# Patient Record
Sex: FEMALE | Race: WHITE
[De-identification: names, ages, dates, MRNs, and addresses within clinical notes are randomized per-mention and may not be internally consistent; named-entity substitution may affect disease eponyms.]

---

## 2017-08-08 ENCOUNTER — HOSPITAL ENCOUNTER (OUTPATIENT)
Dept: HOSPITAL 74 - JER | Age: 57
Setting detail: OBSERVATION
LOS: 2 days | Discharge: HOME | End: 2017-08-10
Attending: FAMILY MEDICINE | Admitting: FAMILY MEDICINE
Payer: COMMERCIAL

## 2017-08-08 VITALS — BODY MASS INDEX: 22.5 KG/M2

## 2017-08-08 DIAGNOSIS — R07.9: ICD-10-CM

## 2017-08-08 DIAGNOSIS — J45.909: ICD-10-CM

## 2017-08-08 DIAGNOSIS — M25.519: ICD-10-CM

## 2017-08-08 DIAGNOSIS — F41.9: ICD-10-CM

## 2017-08-08 DIAGNOSIS — B02.9: ICD-10-CM

## 2017-08-08 DIAGNOSIS — M54.5: Primary | ICD-10-CM

## 2017-08-08 DIAGNOSIS — F32.9: ICD-10-CM

## 2017-08-08 DIAGNOSIS — Z87.891: ICD-10-CM

## 2017-08-08 DIAGNOSIS — M54.6: ICD-10-CM

## 2017-08-08 DIAGNOSIS — M79.1: ICD-10-CM

## 2017-08-08 DIAGNOSIS — G62.9: ICD-10-CM

## 2017-08-08 LAB
ALBUMIN SERPL-MCNC: 4.2 G/DL (ref 3.4–5)
ALP SERPL-CCNC: 69 U/L (ref 45–117)
ALT SERPL-CCNC: 23 U/L (ref 12–78)
ANION GAP SERPL CALC-SCNC: 6 MMOL/L (ref 8–16)
APPEARANCE UR: CLEAR
AST SERPL-CCNC: 21 U/L (ref 15–37)
BASOPHILS # BLD: 1 % (ref 0–2)
BILIRUB SERPL-MCNC: 0.8 MG/DL (ref 0.2–1)
BILIRUB UR STRIP.AUTO-MCNC: NEGATIVE MG/DL
CALCIUM SERPL-MCNC: 9.8 MG/DL (ref 8.5–10.1)
CO2 SERPL-SCNC: 32 MMOL/L (ref 21–32)
COLOR UR: YELLOW
CREAT SERPL-MCNC: 0.7 MG/DL (ref 0.55–1.02)
DEPRECATED RDW RBC AUTO: 12.4 % (ref 11.6–15.6)
EOSINOPHIL # BLD: 1.4 % (ref 0–4.5)
GLUCOSE SERPL-MCNC: 91 MG/DL (ref 74–106)
KETONES UR QL STRIP: (no result)
LEUKOCYTE ESTERASE UR QL STRIP.AUTO: NEGATIVE
MCH RBC QN AUTO: 31.9 PG (ref 25.7–33.7)
MCHC RBC AUTO-ENTMCNC: 34.1 G/DL (ref 32–36)
MCV RBC: 93.6 FL (ref 80–96)
NEUTROPHILS # BLD: 50.3 % (ref 42.8–82.8)
NITRITE UR QL STRIP: NEGATIVE
PH UR: 6 [PH] (ref 5–8)
PLATELET # BLD AUTO: 211 K/MM3 (ref 134–434)
PMV BLD: 8.9 FL (ref 7.5–11.1)
PROT SERPL-MCNC: 8 G/DL (ref 6.4–8.2)
PROT UR QL STRIP: NEGATIVE
PROT UR QL STRIP: NEGATIVE
RBC # UR STRIP: NEGATIVE /UL
UROBILINOGEN UR STRIP-MCNC: NEGATIVE MG/DL (ref 0.2–1)
VIT B12 SERPL-MCNC: 1022 PG/ML (ref 180–914)
WBC # BLD AUTO: 5.4 K/MM3 (ref 4–10)

## 2017-08-08 PROCEDURE — G0378 HOSPITAL OBSERVATION PER HR: HCPCS

## 2017-08-08 PROCEDURE — 3E0F7GC INTRODUCTION OF OTHER THERAPEUTIC SUBSTANCE INTO RESPIRATORY TRACT, VIA NATURAL OR ARTIFICIAL OPENING: ICD-10-PCS | Performed by: FAMILY MEDICINE

## 2017-08-08 PROCEDURE — 3E013GC INTRODUCTION OF OTHER THERAPEUTIC SUBSTANCE INTO SUBCUTANEOUS TISSUE, PERCUTANEOUS APPROACH: ICD-10-PCS | Performed by: FAMILY MEDICINE

## 2017-08-08 PROCEDURE — 3E0337Z INTRODUCTION OF ELECTROLYTIC AND WATER BALANCE SUBSTANCE INTO PERIPHERAL VEIN, PERCUTANEOUS APPROACH: ICD-10-PCS | Performed by: FAMILY MEDICINE

## 2017-08-08 PROCEDURE — 3E033GC INTRODUCTION OF OTHER THERAPEUTIC SUBSTANCE INTO PERIPHERAL VEIN, PERCUTANEOUS APPROACH: ICD-10-PCS | Performed by: FAMILY MEDICINE

## 2017-08-08 RX ADMIN — HEPARIN SODIUM SCH UNIT: 5000 INJECTION, SOLUTION INTRAVENOUS; SUBCUTANEOUS at 22:22

## 2017-08-08 RX ADMIN — ACETAMINOPHEN PRN MG: 325 TABLET ORAL at 22:26

## 2017-08-08 RX ADMIN — GABAPENTIN SCH MG: 100 CAPSULE ORAL at 22:22

## 2017-08-08 NOTE — PN
Progress Note (short form)





- Note


Progress Note: 


NEUROSURGERY CONSULT DICTATED





Chart reviewed


C and LS spine MRI reviewed


57F with asthma c/o back pain x 1 month. She was sent by Dr. Herrmann.  CP x 2 

days. She states that her CP is related to her asthma and coughs. Her back pain 

started in her mid back with numbness, which progressed down her slow back with 

numbness/burning of her left leg and toes.  No B/B dysfunction. Denies H/A, or N

/V or visual changes.  No fever/chill.


PE: AF, VSS


General- unremarkable and normal


CN- intact; Motor- 5/5 except L IP 4+/5; Sensation- intact LT/vibration; DTR- 2

+ UE, 3 + LE


WBC 5.3, Cr 0.7


C spine MRI: mild C5-6 disc bulge with minimal thecal sac impingement; minimal 

disc bulge C4-5 and C6-7; preserved lordosis


LS spine MRI: Mild L4-5 broad-based disc bulge; preserved lordosis


Minimal C and LS spine disc bulge unlikely the cause of her symptoms


T spine MRI reasonable given original location of pain and B LE symptoms


If persistent symptoms and T spine non-contributory could consider EMG/NCS and 

possibly LP per neurology


Trial of Neurontin


No nuerosurgical lesion thus far on her MRI's

## 2017-08-08 NOTE — PDOC
History of Present Illness





- General


Chief Complaint: Back Pain


Stated Complaint: PAIN/ CHEST, BACK (PCP REFERRED)


Time Seen by Provider: 08/08/17 16:32


History Source: Patient


Exam Limitations: No Limitations





- History of Present Illness


Initial Comments: 





08/08/17 17:28


The patient is a 57F with a PMH of asthma who was sent to the ED by her PCP for 

back pain workup. She was sent by Dr. Herrmann, and is a patient of Dr. Godinez. 

The patient is complaining of back pain x 1 month and CP x 2 days. She states 

that her CP is related to her asthma. Her back pain started in her t-spine with 

numbness, and has progressed down her spine to her lumbar spine and now the 

numbness has reached the tips of her toes. 


Dr. Herrmann wants a b-12, MRI of her spine done. 





Allergies: none


Surg: none


Soc: none





Past History





- Past Medical History


Allergies/Adverse Reactions: 


 Allergies











Allergy/AdvReac Type Severity Reaction Status Date / Time


 


No Known Allergies Allergy   Verified 08/08/17 15:47











Home Medications: 


Ambulatory Orders





Albuterol Sulfate [Proair Hfa -] 2 inh PO QID PRN #1 hfa.aer.ad 04/21/15 


Zolpidem Tartrate [Ambien] 5 mg PO HS #14 tablet 04/21/15 


Albuterol Sulfate Inhaler - [Ventolin Hfa Inhaler -] 1 - 2 inh PO QID #1 

inhaler 11/29/15 


Alprazolam [Xanax] 0.5 mg PO HS PRN 11/29/15 


Oxycodone HCl/Acetaminophen [Percocet 5/325 -] 1 tab PO Q6H #20 tablet 11/29/15 


Prednisone [Deltasone -] 40 mg PO DAILY #14 tablet 11/29/15 








Asthma: Yes





- Surgical History


Abdominal Surgery: Yes (TUBAL LIGATION)





- Psycho/Social/Smoking Cessation Hx


Anxiety: Yes


Suicidal Ideation: No


Smoking History: Former smoker


Have you smoked in the past 12 months: No


Number of Cigarettes Smoked Daily: 0


If you are a former smoker, when did you quit?: 6 months ago


Information on smoking cessation initiated: No


Hx Alcohol Use: No


Substance Use Type: Alcohol





**Review of Systems





- Review of Systems


Able to Perform ROS?: Yes


Is the patient limited English proficient: No


Constitutional: No: Chills, Fever


ABD/GI: Yes: Nausea, Vomiting, Other (abd pain).  No: Constipated, Diarrhea


Neurological: Yes: Numbness, Tingling, Weakness (whole body), Unsteady Gait.  No

: Ataxia, Dizziness





*Physical Exam





- Vital Signs


 Last Vital Signs











Temp Pulse Resp BP Pulse Ox


 


 98.3 F   76   18   152/85   97 


 


 08/08/17 15:47  08/08/17 15:47  08/08/17 15:47  08/08/17 15:47  08/08/17 15:47














- Physical Exam


General Appearance: Yes: Nourished, Appropriately Dressed.  No: Apparent 

Distress, Severe Distress


HEENT: positive: Normal Voice, Hearing Grossly Normal


Respiratory/Chest: positive: Lungs Clear, Normal Breath Sounds.  negative: 

Chest Tender, Respiratory Distress, Accessory Muscle Use


Cardiovascular: positive: Regular Rhythm, Regular Rate, S1, S2.  negative: 

Diastolic Murmur, Systolic Murmur


Gastrointestinal/Abdominal: positive: Flat, Soft.  negative: Tender, Distended, 

Guarding, Rebound, Tenderness


Musculoskeletal: positive: CVA Tenderness (L).  negative: CVA Tenderness, CVA 

Tenderness (R)


Integumentary: positive: Dry, Warm.  negative: Swelling, Ecchymosis


Neurologic: positive: CNs II-XII NML intact, Fully Oriented, Alert, Normal Mood/

Affect, Motor Strength 5/5, Respond to painful stimul, Responsive.  negative: 

Facial Droop, Numbness, Sensory Deficit, Confused, Disoriented





ED Treatment Course





- LABORATORY


CBC & Chemistry Diagram: 


 08/08/17 18:45





 08/08/17 18:45





Medical Decision Making





- Medical Decision Making


08/08/17 17:36


The patient is a 57F with a PMH of asthma who presents to the ED from her PCP's 

office for further workup. I have ordered basic labs and a UA and will call her 

PCP for admission for obs to further evaluate the patient's back pain.





08/08/17 18:17


I have spoken to Dr. Wing. He wants the patient admitted to obs under Dr. Godinez. Consult neurosurg Dr. Nichole and consult Dr. Herrmann for neurology. Orders 

will be put in.





08/08/17 19:05


Patient requesting pain medication. I have ordered 1 percocet. Pending 

admission.





08/08/17 19:05


Patient signed out to night team, Dr. Simmons.





*DC/Admit/Observation/Transfer


Diagnosis at time of Disposition: 


Back pain


Qualifiers:


 Back pain location: low back pain Chronicity: acute Back pain laterality: 

unspecified Sciatica presence: unspecified whether sciatica present Qualified 

Code(s): M54.5 - Low back pain





- Discharge Dispostion


Condition at time of disposition: Stable


Admit: Yes





- Referrals


Referrals: 


Damon Godinez MD [Primary Care Provider] - 





- Attestations


Physician Attestion: 





08/08/17 19:05








I, Dr. James Cerda, attest that this document has been prepared under my 

direction and personally reviewed by me in its entirety.   I further attest, 

that it accurately reflects all work, treatment, procedures and medical decision

-making performed by me.

## 2017-08-08 NOTE — PDOC
Attending Attestation





- Resident


Resident Name: OrkayJames





- ED Attending Attestation


I have performed the following: I have examined & evaluated the patient, The 

case was reviewed & discussed with the resident, I agree w/resident's findings 

& plan, Exceptions are as noted





- HPI


HPI: 





08/08/17 18:13


Agree with the resident's HPI as documented in the electronic medical record.





- Physicial Exam


PE: 





08/08/17 18:13


Agree with the resident's physical examination as documented in the electronic 

medical record.





- Medical Decision Making





08/08/17 18:13


57-year-old female with history of asthma presents the emergency department 

with 3 week history of descending paresthesias starting from her upper back and 

now extending into her lower extremities and the bottom of her feet; she was 

seen by a neurologist today who feels that she has a myelopathy.


Plan:


1. Admit to observation


2. MRI of the cervical thoracic and lumbar spine


3. Neurology consult


4. Observe and reevaluate

## 2017-08-09 LAB
ALBUMIN SERPL-MCNC: 3.7 G/DL (ref 3.4–5)
ALP SERPL-CCNC: 64 U/L (ref 45–117)
ALT SERPL-CCNC: 22 U/L (ref 12–78)
ANION GAP SERPL CALC-SCNC: 7 MMOL/L (ref 8–16)
AST SERPL-CCNC: 19 U/L (ref 15–37)
BASOPHILS # BLD: 0.6 % (ref 0–2)
BILIRUB SERPL-MCNC: 1 MG/DL (ref 0.2–1)
CALCIUM SERPL-MCNC: 9.3 MG/DL (ref 8.5–10.1)
CO2 SERPL-SCNC: 33 MMOL/L (ref 21–32)
CREAT SERPL-MCNC: 0.7 MG/DL (ref 0.55–1.02)
CRP SERPL-MCNC: < 0.3 MG/DL (ref 0–0.3)
DEPRECATED RDW RBC AUTO: 12.6 % (ref 11.6–15.6)
EOSINOPHIL # BLD: 2.2 % (ref 0–4.5)
ERYTHROCYTE [SEDIMENTATION RATE] IN BLOOD BY WESTERGREN METHOD: 16 MM/HR (ref 0–30)
GLUCOSE SERPL-MCNC: 98 MG/DL (ref 74–106)
MCH RBC QN AUTO: 31.1 PG (ref 25.7–33.7)
MCHC RBC AUTO-ENTMCNC: 33.2 G/DL (ref 32–36)
MCV RBC: 93.8 FL (ref 80–96)
NEUTROPHILS # BLD: 42.1 % (ref 42.8–82.8)
PLATELET # BLD AUTO: 180 K/MM3 (ref 134–434)
PMV BLD: 8.6 FL (ref 7.5–11.1)
PROT SERPL-MCNC: 7.1 G/DL (ref 6.4–8.2)
SP GR UR: 1.01 (ref 1–1.02)
TROPONIN I SERPL-MCNC: < 0.02 NG/ML (ref 0–0.05)
TSH SERPL-ACNC: 3.35 UIU/ML (ref 0.36–3.74)
WBC # BLD AUTO: 4.6 K/MM3 (ref 4–10)

## 2017-08-09 RX ADMIN — ALBUTEROL SULFATE SCH: 2.5 SOLUTION RESPIRATORY (INHALATION) at 00:18

## 2017-08-09 RX ADMIN — ALBUTEROL SULFATE SCH AMP: 2.5 SOLUTION RESPIRATORY (INHALATION) at 06:24

## 2017-08-09 RX ADMIN — HEPARIN SODIUM SCH UNIT: 5000 INJECTION, SOLUTION INTRAVENOUS; SUBCUTANEOUS at 09:29

## 2017-08-09 RX ADMIN — GUAIFENESIN AND CODEINE PHOSPHATE PRN ML: 10; 100 LIQUID ORAL at 21:25

## 2017-08-09 RX ADMIN — RANITIDINE SCH MG: 150 TABLET ORAL at 21:25

## 2017-08-09 RX ADMIN — RANITIDINE SCH MG: 150 TABLET ORAL at 12:21

## 2017-08-09 RX ADMIN — ALBUTEROL SULFATE SCH AMP: 2.5 SOLUTION RESPIRATORY (INHALATION) at 11:46

## 2017-08-09 RX ADMIN — ALBUTEROL SULFATE SCH AMP: 2.5 SOLUTION RESPIRATORY (INHALATION) at 18:36

## 2017-08-09 RX ADMIN — ACETAMINOPHEN PRN MG: 325 TABLET ORAL at 06:12

## 2017-08-09 RX ADMIN — GABAPENTIN SCH MG: 100 CAPSULE ORAL at 14:49

## 2017-08-09 RX ADMIN — HEPARIN SODIUM SCH UNIT: 5000 INJECTION, SOLUTION INTRAVENOUS; SUBCUTANEOUS at 21:25

## 2017-08-09 RX ADMIN — GABAPENTIN SCH MG: 100 CAPSULE ORAL at 06:12

## 2017-08-09 RX ADMIN — GABAPENTIN SCH MG: 100 CAPSULE ORAL at 21:25

## 2017-08-09 NOTE — CON.NEURO
Consult





- History of Present Illness


History of Present Illness: 


7 year old female history of asthma, she has been experiencing severe lower 

back pain and it shoots down to thoracic region and also she is feeling 

tingling and numbness in her feet. Patient saw me in clinic yesterday and she 

has bialteral clonus and difficulty walking on toes and heel. She has grade 4 

reflex in lower extremity and sensory ( tingling numbness in leg) symptoms in 

lower extremity.


She is complaining of severe back pain and she is getting oxycodone prn for 

pain. 





- Past Medical History


Pulmonary: Yes: Asthma


Psych: Yes: Anxiety, Depression


Rheumatology: Yes: Fibromyalgia





- Alcohol/Substance Use


Hx Alcohol Use: Yes (OCCASIONALLY)





- Smoking History


Smoking history: Former smoker


Have you smoked in the past 12 months: No


Aproximately how many cigarettes per day: 0


If you are a former smoker, when did you quit?: 6 months ago





Home Medications





- Allergies


Allergies/Adverse Reactions: 


 Allergies











Allergy/AdvReac Type Severity Reaction Status Date / Time


 


No Known Allergies Allergy   Verified 08/08/17 15:47














- Home Medications


Home Medications: 


Ambulatory Orders





Mometasone/Formoterol [Dulera 100 Mcg/5 Mcg Inhaler] 2 inh IH BID 08/08/17 











Physical Exam-Neuro


Vital Signs: 


 Vital Signs











Temperature  98.5 F   08/09/17 14:00


 


Pulse Rate  78   08/09/17 14:00


 


Respiratory Rate  17   08/09/17 14:00


 


Blood Pressure  103/64   08/09/17 14:00


 


O2 Sat by Pulse Oximetry (%)  100   08/09/17 10:00











Labs: 


 CBC, BMP





 08/09/17 07:30 





 08/09/17 07:30 











NIH Stroke Scale





- Total Score


NIH Stroke Scale Score: 0





Assessment/Plan


 cc lower back and thoracic pain 


57 year old female history of asthma, she has been experiencing severe lower 

back pain and it shoots down to thoracic region and also she is feeling 

tingling and numbness in her feet. Patient saw me in clinic yesterday and she 

has bialteral clonus and difficulty walking on toes and heel. She has grade 4 

reflex in lower extremity and sensory ( tingling numbness in leg) symptoms in 

lower extremity.


She is complaining of severe back pain and she is getting oxycodone prn for 

pain. 


Past Medical History of fibromyalgia, depression , and asthma


No known allergies








home medications








Mometasone/Formoterol [Dulera 100 Mcg/5 Mcg Inhaler] 2 inh IH BID 08/08/17 











Neurological Examination


Aler oriented x 3 


cn all intact 


motor strengh is 5/5


There is no motor weakness on testing individual group of muscle ( hip flexion, 

extension, knee flexion and exxtension, and dorsi and planter flexion)


she has been slight unsteady on walking and slight difficulty walking on toe 

and heel


There is no sensory loss





There is hyper reflexia in lower extremity upto grade 4 and mild clonus more 

pronounced on right side





planter is bilateral flexor 








Assessment-- Evidence of myelopahty ( complaining of severe thoracic pain and 

lumbar pain) and There is hyper reflexia and clonus in lower extremity.





Plan patient was sent from ED for urgent mri of c /t and l spine, as I spoke to 

ED doctor yeserday afternoon ( August 8)





Plan continue percocet prn for pain


- waiting for MRI of C/T AND L spine


Consult Neurosurgery after mri of spine done





Thanks for consult





Jose Herrmann MD


Neurology Attending.

## 2017-08-09 NOTE — EKG
Test Reason : 

Blood Pressure : ***/*** mmHG

Vent. Rate : 088 BPM     Atrial Rate : 088 BPM

   P-R Int : 166 ms          QRS Dur : 104 ms

    QT Int : 386 ms       P-R-T Axes : 073 080 050 degrees

   QTc Int : 467 ms

 

NORMAL SINUS RHYTHM

NORMAL ECG

WHEN COMPARED WITH ECG OF 08-AUG-2017 15:33,

NO SIGNIFICANT CHANGE WAS FOUND

Confirmed by HOWIE TORREZ MD ([id number]) on 8/9/2017 2:39:37 PM

 

Referred By: JOSE RODRIGUEZ           Confirmed By:HOWIE TORREZ MD

## 2017-08-09 NOTE — CONSULT
Consult


Consult Specialty:: Rheumatology





- History of Present Illness


History of Present Illness: 


57 year old female with history of asthma and short episode of inflammatory 

arthritis in the past, admitted with severe back pain and paresthesias in legs.





HPI.  One month ago the patient developed severe pain in the thoracic spine.  

The pain was radiated to legs and she reports intermittent paresthesias in 

feet. She does not respond to analgesics.  She was seen by Neurology (Alize Hutton).  C spine MRI: mild C5-6 disc bulge with minimal thecal sac impingement

; minimal disc bulge C4-5 and C6-7; preserved lordosis.    LS spine MRI: Mild L4

-5 broad-based disc bulge; preserved lordosis.    Minimal C and LS spine disc 

bulge.  Changes probably do not explain the etiology of the pain.  At the 

present time the patient is having an MRI of the thoracic spine.  Consult by 

Dr. Ramos pending.





I saw the patient in my office on 5/10/16.  At that time she had an episode of 

systemic inflammatory arthritis that resolved spontaneously after 2 weeks and 

serology including FRANCESCA, rheumatoid factor and CCP were negative.  Probably she 

had viral induced arthritis.














- History Source


History Provided By: Patient, Medical Record


Limitations to Obtaining History: No Limitations





- Past Medical History


Pulmonary: Yes: Asthma


Psych: Yes: Anxiety, Depression


Rheumatology: Yes: Fibromyalgia





- Alcohol/Substance Use


Hx Alcohol Use: Yes (OCCASIONALLY)





- Smoking History


Smoking history: Former smoker


Have you smoked in the past 12 months: No


Aproximately how many cigarettes per day: 0


If you are a former smoker, when did you quit?: 6 months ago





Home Medications





- Allergies


Allergies/Adverse Reactions: 


 Allergies











Allergy/AdvReac Type Severity Reaction Status Date / Time


 


No Known Allergies Allergy   Verified 08/08/17 15:47














- Home Medications


Home Medications: 


Ambulatory Orders





Mometasone/Formoterol [Dulera 100 Mcg/5 Mcg Inhaler] 2 inh IH BID 08/08/17 











Family Disease History





- Family Disease History


Family Disease History: Other: Mother (Hypertension and osteoporosis)





Review of Systems





- Review of Systems


Constitutional: reports: No Symptoms


Eyes: reports: No Symptoms


HENT: reports: No Symptoms


Neck: reports: No Symptoms


Cardiovascular: reports: No Symptoms


Respiratory: reports: No Symptoms


Gastrointestinal: reports: No Symptoms


Genitourinary: reports: No Symptoms


Integumentary: reports: No Symptoms


Neurological: reports: Other (See HPI)


Endocrine: reports: No Symptoms


Hematology/Lymphatic: reports: No Symptoms





Physical Exam


Vital Signs: 


 Vital Signs











Temperature  98.5 F   08/09/17 14:00


 


Pulse Rate  78   08/09/17 14:00


 


Respiratory Rate  17   08/09/17 14:00


 


Blood Pressure  103/64   08/09/17 14:00


 


O2 Sat by Pulse Oximetry (%)  100   08/09/17 10:00











Constitutional: Yes: Moderate Distress


Eyes: Yes: WNL


HENT: Yes: WNL


Neck: Yes: WNL


Cardiovascular: Yes: WNL


Respiratory: Yes: WNL


Gastrointestinal: Yes: WNL


Extremities: Yes: Other (Severe tenderness in thoracic cpine.  Reflexes normal.

   Hands with Heberden's nodes, no active joints.)


Labs: 


 CBC, BMP





 08/09/17 07:30 





 08/09/17 07:30 





 Laboratory Tests











  08/08/17 08/09/17 08/09/17





  23:20 07:30 07:30


 


ESR    16


 


C-Reactive Protein   < 0.3 


 


TSH   3.35 


 


Urine Color  Yellow  


 


Urine Appearance  Clear  


 


Urine pH  6.0  


 


Ur Specific Gravity  1.015  


 


Urine Protein  Negative  


 


Urine Glucose (UA)  Negative  


 


Urine Ketones  Trace H  


 


Urine Blood  Negative  


 


Urine Nitrite  Negative  


 


Urine Bilirubin  Negative  


 


Urine Urobilinogen  Negative  


 


Ur Leukocyte Esterase  Negative  


 


Urine HCG, Qual  Negative  














Problem List





- Problems


(1) Back pain


Assessment/Plan: 


Acute thoracic spine pain.  Etiology to be determined.


The patient does not have other inflammatory arthritis.


Plan.   Patient is having an MRI of the thoracic spine.  Being followed up by 

Neurology and Neurosurgery.   I will follow up report.  Continue same 

medications.





Code(s): M54.9 - DORSALGIA, UNSPECIFIED   Qualifiers: 


     Back pain location: low back pain     Chronicity: acute     Back pain 

laterality: unspecified     Sciatica presence: unspecified whether sciatica 

present     Qualified Code(s): M54.5 - Low back pain

## 2017-08-09 NOTE — CONS
REQUESTING PHYSICIAN:  Damon Godinez M.D.  

 

CHIEF COMPLAINT:  Mid and lower back pain and paresthesias including left lower 
and

right lower extremity paresthesia of 1-month duration.  

 

HISTORY OF PRESENT ILLNESS:  The patient is a 57-year-old right handed female 
with a

history of asthma on chronic inhaler treatment, who complains of a 1-month 
history of

worsening mid and lower back pain.  She also has burning in her low back as 
well as

burning and numbness in her bilateral lower extremities, left greater than 
right. 

There is also some subjective weakness in the left lower extremity.  She denies 
bowel

or bladder incontinence.  She has no increasing headache, nausea, vomiting, or 
visual

changes.  She denies Lhermitte sign.  She has no symptoms in her arms.  Because 
of

the pain it is difficult for her to ambulate.  She did see Dr. Herrmann, her

neurologist, and was prescribed a MRI of the cervical and lumbar spine.  
Because of

her persistent symptoms she was sent to the emergency room for evaluation and

treatment.  

 

PAST MEDICAL HISTORY: Significant for asthma. 

 

MEDICATIONS:  Inhaler.  

 

ALLERGIES:  No known drug allergy.  

 

FAMILY HISTORY:  Noncontributory.  

 

SOCIAL HISTORY:  In terms of social history she does not work and lives with 
her son

at home.  She used to be a smoker but has not smoked for a year or two.  She 
drinks

alcohol socially.  She does not work.  

 

REVIEW OF SYSTEMS:  Otherwise negative for constitutional, cardiovascular,

gastrointestinal, pulmonary, genitourinary, gynecological, oncological, 
neurological,

and psychological problems except for the above.  

 

FAMILY HISTORY:  Noncontributory.

 

PHYSICAL EXAMINATION: 

Vital Signs:  Temperature is 98.3, blood pressure is 152/85 with a pulse rate 
of 76,

and O2 saturation is 97% on room air.  

HEENT:  Shows her to be normocephalic, atraumatic and icteric.  

Neck:  Supple with no nuchal rigidity.  

Coronary:  Demonstrated regular rhythm.  

Lungs:  Clear bilaterally.  

Abdomen:  Benign.  

Extremities:  Shows no signs of DVT.  

Neurologic:  She is awake, alert, oriented x4.  Cranial nerves examination 
intact

II-XII.  Motor examination shows 5/5 strength except left iliopsoas which is 4/
5 and

left quadriceps which is 4+/5.  This is pain limited.  Sensory examination shows

intact sensation light tough and vibratory sensation.  Deep tendon reflexes are 
2+ in

the upper extremity and 3+ in the lower extremity.  Gait is not tested for 
safety

reasons.  

Cerebellar:  Shows intact finger-to-nose examination.  

 

LABORATORY EXAMINATION:  Shows white blood cell count to be 5400, hemoglobin is 
13.6

and platelet count is 211,000.  Serum sodium 139, potassium is 4.1, BUN 12,

creatinine 0.7.  Vitamin B12 level is pending.  MRI of the cervical spine (2016)

demonstrated C5-C6 greater than C4-C5, C6-C7 disk bulge.  There is preserved 
cervical

lordosis.  There is minimal thecal sac impingement at C5-C6.  There is no cord 
or

nerve root impingement at any level.  Cervical lordosis is well-maintained.  
Lumbar

spine MRI (2016) demonstrated broad-based L4-L5 disk bulge without canal 
compromise.  

There is no canal foraminal stenosis.  Lordosis is similarly preserved in the 
lumbar spine.

 

 

IMPRESSION:  

1.  Mild cervical and lumbar degenerative disk bulges without significant

neurological element impingement.  

2.  Rule out thoracic mid or lower thoracic spine pathology.

3.  Asthma.  

 

RECOMMENDATIONS:  The patient presents with 1-month history of progressive mid 
and

lower back pain.  She has paresthesia of her legs as well as numbness.  It is

predominantly left-sided.  She has no upper extremity symptoms at this time.  
MRI of

the thoracic spine is reasonable given that cervical and lumbar spine pathology 
does

not adequately explain the sources of her symptomatology.  If a T-spine MRI is 
not

contributory, EMG and nerve conduction studies of the lower extremity as

well as possibly a lumbar puncture per the neurology team would be reasonable.  
The

above was discussed with the patient at that time.  In the meanwhile, I took the

liberty of starting her on gabapentin 100 mg p.o. three times a day.  The 
cervical

and lumbar spine MRI findings were reviewed with the patient.  

 

 

EDWARD LINDO M.D.

 

[id number]

DD: 08/08/2017 20:13

DT: 08/09/2017 08:52

Job #:  [id number]

[email or web address]

## 2017-08-09 NOTE — PN
Progress Note (short form)





- Note


Progress Note: 


NEUROSURGERY 





Back and LE symptoms the same


Burning L lat thigh


PE: Tmax 99.4, AF, VSS


General- unremarkable and normal


CN- intact; Motor- 5/5 except L IP 4+/5; Sensation- intact LT/vibration; DTR- 2

+ UE, 3 + LE, no clonus, no Babinski


C spine MRI (7/2016): mild C5-6 disc bulge with minimal thecal sac impingement; 

minimal disc bulge C4-5 and C6-7; preserved lordosis


LS spine MRI (7/2016): Mild L4-5 broad-based disc bulge; preserved lordosis


Minimal C and LS spine disc bulge unlikely the cause of her symptoms


T spine MRI reasonable given location of pain and B LE symptoms with associated 

hyperreflexia


If persistent symptoms could consider LP per neurology


EMG/NCS ordered


Neurontin started


No nuerosurgical lesion noted based on C and LS spine MRI's

## 2017-08-09 NOTE — HP
Admitting History and Physical





- Primary Care Physician


PCP: Damon Godinez





- Admission


Chief Complaint: NEUROPATHY PROGRESSIVE AND DESCENDING TO LEGS, INTRACTABLE PAIN


History of Present Illness: 


The patient is a 57F with a PMH of asthma who was sent to the ED by her PCP for 

back pain workup. She was sent by Dr. Herrmann, and is a patient of Dr. Godinez. 

The patient is complaining of back pain x 1 month and CP x 2 days. She states 

that her CP is related to her asthma. Her back pain started in her t-spine with 

numbness, and has progressed down her spine to her lumbar spine and now the 

numbness has reached the tips of her toes. 


Dr. Herrmann wants a b-12, MRI of her spine done. 


History Source: Patient, Medical Record


Limitations to Obtaining History: No Limitations





- Past Medical History


Pulmonary: Yes: Asthma


Psych: Yes: Anxiety, Depression


Rheumatology: Yes: Fibromyalgia





- Smoking History


Smoking history: Former smoker


Have you smoked in the past 12 months: No


Aproximately how many cigarettes per day: 0


If you are a former smoker, when did you quit?: 6 months ago





- Alcohol/Substance Use


Hx Alcohol Use: Yes (OCCASIONALLY)





Home Medications





- Allergies


Allergies/Adverse Reactions: 


 Allergies











Allergy/AdvReac Type Severity Reaction Status Date / Time


 


No Known Allergies Allergy   Verified 08/08/17 15:47














- Home Medications


Home Medications: 


Ambulatory Orders





Mometasone/Formoterol [Dulera 100 Mcg/5 Mcg Inhaler] 2 inh IH BID 08/08/17 











Review of Systems





- Review of Systems


Constitutional: reports: Loss of Appetite, Malaise


Eyes: reports: No Symptoms


HENT: reports: No Symptoms


Neck: reports: No Symptoms


Cardiovascular: reports: No Symptoms


Respiratory: reports: Cough


Gastrointestinal: reports: Abdominal Pain


Genitourinary: reports: No Symptoms


Musculoskeletal: reports: Back Pain, Decreased ROM, Extremity Pain, Joint Pain, 

Muscle Pain


Integumentary: reports: No Symptoms


Neurological: reports: Numbness, Weakness


Endocrine: reports: No Symptoms


Hematology/Lymphatic: reports: No Symptoms


Psychiatric: reports: Anxiety, Depression





Physical Examination


Vital Signs: 


 Vital Signs











Temperature  98.8 F   08/09/17 08:44


 


Pulse Rate  90   08/09/17 08:44


 


Respiratory Rate  20   08/09/17 10:00


 


Blood Pressure  111/72   08/09/17 08:44


 


O2 Sat by Pulse Oximetry (%)  100   08/09/17 10:00











Constitutional: Yes: Moderate Distress


Eyes: Yes: WNL


HENT: Yes: WNL


Neck: Yes: WNL


Cardiovascular: Yes: WNL


Respiratory: Yes: WNL


Gastrointestinal: Yes: WNL


Renal/: Yes: WNL


Musculoskeletal: Yes: Back Pain, Muscle Pain, Muscle Weakness


Extremities: Yes: WNL


Edema: No


Peripheral Pulses WNL: Yes


Integumentary: Yes: WNL


Wound/Incision: Yes: Clean/Dry


Neurological: Yes: WNL


...Motor Strength: WNL


Psychiatric: Yes: Other


Labs: 


 CBC, BMP





 08/09/17 07:30 





 08/09/17 07:30 











Problem List





- Problems


(1) Back pain


Code(s): M54.9 - DORSALGIA, UNSPECIFIED   Qualifiers: 


     Back pain location: low back pain     Chronicity: acute     Back pain 

laterality: unspecified     Sciatica presence: unspecified whether sciatica 

present     Qualified Code(s): M54.5 - Low back pain  





(2) Atypical chest pain


Code(s): R07.89 - OTHER CHEST PAIN





(3) Myalgia


Code(s): M79.1 - MYALGIA





(4) Shoulder pain


Code(s): M25.519 - PAIN IN UNSPECIFIED SHOULDER   Qualifiers: 


     Laterality: bilateral





(5) Neuropathy


Code(s): G62.9 - POLYNEUROPATHY, UNSPECIFIED








Assessment/Plan


MEDROL STARTED


NOT SURE IF THIS IS EXAGERRATED WITH PAIN WITH COMBINATION OF ANXIETY


H/O FIBROMYALGIA?


RHEUMATOLOGY CONSULT


ESR AND CRP NORMAL


NEUROLOGY AND NEUROSURGERY F/O APPRECIATED


MRI SPINE PENDING


NO OPIODS


TRAMADOL PRN


NEBS


ROBITUSSIN PRN

## 2017-08-10 VITALS — HEART RATE: 95 BPM | SYSTOLIC BLOOD PRESSURE: 138 MMHG | TEMPERATURE: 98.5 F | DIASTOLIC BLOOD PRESSURE: 72 MMHG

## 2017-08-10 RX ADMIN — ALBUTEROL SULFATE SCH AMP: 2.5 SOLUTION RESPIRATORY (INHALATION) at 00:05

## 2017-08-10 RX ADMIN — HEPARIN SODIUM SCH UNIT: 5000 INJECTION, SOLUTION INTRAVENOUS; SUBCUTANEOUS at 09:13

## 2017-08-10 RX ADMIN — ALBUTEROL SULFATE SCH AMP: 2.5 SOLUTION RESPIRATORY (INHALATION) at 17:27

## 2017-08-10 RX ADMIN — GUAIFENESIN AND CODEINE PHOSPHATE PRN ML: 10; 100 LIQUID ORAL at 06:20

## 2017-08-10 RX ADMIN — RANITIDINE SCH MG: 150 TABLET ORAL at 09:13

## 2017-08-10 RX ADMIN — GABAPENTIN SCH MG: 100 CAPSULE ORAL at 06:20

## 2017-08-10 RX ADMIN — ALBUTEROL SULFATE SCH AMP: 2.5 SOLUTION RESPIRATORY (INHALATION) at 12:35

## 2017-08-10 RX ADMIN — ALBUTEROL SULFATE SCH AMP: 2.5 SOLUTION RESPIRATORY (INHALATION) at 06:32

## 2017-08-10 NOTE — PN
Progress Note (short form)





- Note


Progress Note: 


NEUROSURGERY 





No significant changes


Burning L lat thigh > leg


PE: Tmax 99.4, AF, VSS


General- unremarkable and normal


CN- intact; Motor- 5/5 except L IP 4+/5; Sensation- intact LT/vibration; DTR- 2

+ UE, 3 + LE, no clonus, no Babinski


C spine MRI (8-9-17): mild C5-6 disc bulge with minimal thecal sac impingement; 

minimal disc bulge  C6-7; preserved lordosis; no canal compromise


LS spine MRI (8-9-17): Mild L4-5 broad-based disc bulge; mild facet arthrosis, 

no stenosis


T spine MRI (8-9-17): no gross HNP or cord impingement


Minimal C and LS spine disc bulge unlikely the cause of her symptoms as there 

is no significant canal compromise or cord impingement which could explain her 

LE symptoms


If persistent symptoms could consider LP per neurology


Neurontin started and can increase prn


No neurosurgical lesion noted based on current spine MRI's


F/u with neurology for further eval and tx

## 2017-08-10 NOTE — PN
Progress Note (short form)





- Note


Progress Note: 


cntinue to complain  lower back and thoracic pain and shooting down to left 

lower extremity 


57 year old female history of asthma, she has been experiencing severe lower 

back pain and it shoots down to thoracic region and also she is feeling 

tingling and numbness in her feet. Patient saw me in clinic yesterday and she 

has bialteral clonus and difficulty walking on toes and heel. She has grade 4 

reflex in lower extremity and sensory ( tingling numbness in leg) symptoms in 

lower extremity.


She is complaining of severe back pain and she is getting oxycodone prn for 

pain. 


She had mri of spine done and report is pending 


Past Medical History of fibromyalgia, depression , and asthma


No known allergies











Neurological Examination


Aler oriented x 3 


cn all intact 


motor strengh is 5/5


There is no motor weakness on testing individual group of muscle ( hip flexion, 

extension, knee flexion and exxtension, and dorsi and planter flexion)


she has been slight unsteady on walking and slight difficulty walking on toe 

and heel


There is no sensory loss





There is hyper reflexia in lower extremity upto grade 4 and mild clonus more 

pronounced on right side





planter is bilateral flexor 


exam is unchanged since yesterday 








Assessment--  There is hyper reflexia and severe radicular lumbar and thoracic 

pain.





Plan-- Neurosurgery consult apreciated, and do not see any significant cause 

for cord compression, once official report is unremarkable. Patient can be 

discharged home and follow up with me at clinic


-She is having constipation due to opioids , would add senakot s one tab po bid 


- b12,folate tsh were normal








Thanks for consult





Jose Herrmann MD


Neurology Attending.

## 2017-08-10 NOTE — DS
Physical Examination


Vital Signs: 


 Vital Signs











Temperature  98.0 F   08/10/17 08:07


 


Pulse Rate  77   08/10/17 08:07


 


Respiratory Rate  18   08/10/17 08:07


 


Blood Pressure  125/76   08/10/17 08:07


 


O2 Sat by Pulse Oximetry (%)  100   08/10/17 03:00











Findings/Remarks: 


FOUND TODAY WITH ACUTE SHINGLES HERPES ZOSTER RASH TO LEFT LEG 


Constitutional: Yes: Mild Distress


Eyes: Yes: WNL


HENT: Yes: WNL


Neck: Yes: WNL


Cardiovascular: Yes: WNL


Respiratory: Yes: WNL


Gastrointestinal: Yes: WNL


Renal/: Yes: WNL


Musculoskeletal: Yes: Back Pain


Extremities: Yes: Other


Edema: No


Peripheral Pulses WNL: Yes


Integumentary: Yes: Rash


Wound/Incision: Yes: Open to air


Neurological: Yes: Numbness, Paresthesia, Tingling


...Motor Strength: LLE


Psychiatric: Yes: WNL


Labs: 


 CBC, BMP





 08/09/17 07:30 





 08/09/17 07:30 











Discharge Summary


Reason For Visit: BACK PAIN


Current Active Problems





Back pain (Acute) 


Neuropathy (Acute) 


ACUTE SHINGLES HERPES ZOSTER INFECTION





Procedures: Principal: MRI L AND T SPINE


Other Procedures: LABS CX


Hospital Course: 


ADMITTED WITH WORSENING NEUROPATHY, MRI DONE +HERNIATED LUMBAR DISC, FOUND WITH 

HERPES ZOSTER LEFT THIGH, TREAT WITH VALTRAX FOR 7 DAYS TID, INCREASE 

GABAPENTIN 300 TID, F/U 1 WEEK OUTPATIENT


Condition: Stable





- Instructions


Diet, Activity, Other Instructions: 


REG


SEE DR GODINEZ 1 WEEK


Referrals: 


Damon Godinez MD [Primary Care Provider] - 


Disposition: HOME





- Home Medications


Comprehensive Discharge Medication List: 


Ambulatory Orders





Mometasone/Formoterol [Dulera 100 Mcg/5 Mcg Inhaler] 2 inh IH BID 08/08/17

## 2017-08-11 NOTE — EKG
Test Reason : 

Blood Pressure : ***/*** mmHG

Vent. Rate : 075 BPM     Atrial Rate : 075 BPM

   P-R Int : 130 ms          QRS Dur : 092 ms

    QT Int : 396 ms       P-R-T Axes : 056 073 045 degrees

   QTc Int : 442 ms

 

*** POOR DATA QUALITY, INTERPRETATION MAY BE ADVERSELY AFFECTED

NORMAL SINUS RHYTHM

NORMAL ECG

WHEN COMPARED WITH ECG OF 29-NOV-2015 12:26,

NO SIGNIFICANT CHANGE WAS FOUND

Confirmed by SHERRI AIKEN MD ([id number]) on 8/11/2017 9:34:59 AM

 

Referred By:             Confirmed By:SHERRI AIKEN MD

## 2018-12-23 ENCOUNTER — HOSPITAL ENCOUNTER (EMERGENCY)
Dept: HOSPITAL 74 - JER | Age: 58
Discharge: HOME | End: 2018-12-23
Payer: COMMERCIAL

## 2018-12-23 VITALS — BODY MASS INDEX: 20.9 KG/M2

## 2018-12-23 VITALS — HEART RATE: 112 BPM | DIASTOLIC BLOOD PRESSURE: 80 MMHG | TEMPERATURE: 101.1 F | SYSTOLIC BLOOD PRESSURE: 130 MMHG

## 2018-12-23 DIAGNOSIS — B97.89: ICD-10-CM

## 2018-12-23 DIAGNOSIS — J06.9: Primary | ICD-10-CM

## 2018-12-23 NOTE — PDOC
History of Present Illness





- General


Chief Complaint: Respiratory


Stated Complaint: FEVER,ABDOMINAL


Time Seen by Provider: 12/23/18 17:05





- History of Present Illness


Initial Comments: 





12/23/18 17:15


58-year-old female presents for evaluation of one day of fever chills night 

sweats and body aches. General malaise.





Past History





- Past Medical History


Allergies/Adverse Reactions: 


 Allergies











Allergy/AdvReac Type Severity Reaction Status Date / Time


 


No Known Allergies Allergy   Verified 12/23/18 16:40











Home Medications: 


Ambulatory Orders





NK [No Known Home Medication]  12/23/18 








Asthma: Yes


COPD: No





- Surgical History


Abdominal Surgery: Yes (TUBAL LIGATION)





- Immunization History


Immunization Up to Date: Yes





- Suicide/Smoking/Psychosocial Hx


Smoking History: Former smoker


Have you smoked in the past 12 months: No


Number of Cigarettes Smoked Daily: 0


If you are a former smoker, when did you quit?: 7 years ago


Information on smoking cessation initiated: No


Hx Alcohol Use: No


Drug/Substance Use Hx: No


Substance Use Type: Alcohol





**Review of Systems





- Review of Systems


Constitutional: Yes: Chills, Fever, Malaise, Night Sweats


Respiratory: Yes: Cough


Musculoskeletal: Yes: Muscle Pain





*Physical Exam





- Vital Signs


 Last Vital Signs











Temp Pulse Resp BP Pulse Ox


 


 101.1 F H  112 H  18   130/80   98 


 


 12/23/18 16:40  12/23/18 16:40  12/23/18 16:40  12/23/18 16:40  12/23/18 16:40














- Physical Exam


Comments: 





12/23/18 17:16


HEAD: NC/AT


EYES: Conjuntiva clear


Ears: Canals and TM's normal


NOSE: No d/c


THROAT: Moist mucous membrances, oral pharanx clear, uvula midline


NECK: Supple without adenopathy


CARDIAC: S1 S2


LUNGS: CTA Full and Equal breath sounds


ABDOMEN: Soft NT ND


MS: Full ROM in all joints without edema 


NEUROLOGIC: No gross sensory or motor deficits, NVID


SKIN: Normal color and temperature no lesions or rashes





Moderate Sedation





- Procedure Monitoring


Vital Signs: 


Procedure Monitoring Vital Signs











Temperature  101.1 F H  12/23/18 16:40


 


Pulse Rate  112 H  12/23/18 16:40


 


Respiratory Rate  18   12/23/18 16:40


 


Blood Pressure  130/80   12/23/18 16:40


 


O2 Sat by Pulse Oximetry (%)  98   12/23/18 16:40











*DC/Admit/Observation/Transfer


Diagnosis at time of Disposition: 


 Upper respiratory infection








- Discharge Dispostion


Disposition: HOME


Condition at time of disposition: Stable


Decision to Admit order: No





- Referrals


Referrals: 


Damon Godinez MD [Primary Care Provider] - 





- Patient Instructions


Printed Discharge Instructions:  DI for Viral Upper Respiratory Infection -- 

Adult


Additional Instructions: 


Please take Tylenol and Motrin for fever as directed return to the emergency 

room should symptoms worsen or go unresolved follow-up with your primary care 

physician in one to 2 days for further evaluation and treatment options. IYour 

flu is negative in the emergency room today.





- Post Discharge Activity